# Patient Record
Sex: FEMALE | Race: WHITE | NOT HISPANIC OR LATINO | Employment: UNEMPLOYED | URBAN - NONMETROPOLITAN AREA
[De-identification: names, ages, dates, MRNs, and addresses within clinical notes are randomized per-mention and may not be internally consistent; named-entity substitution may affect disease eponyms.]

---

## 2019-11-19 ENCOUNTER — OFFICE VISIT (OUTPATIENT)
Dept: FAMILY MEDICINE | Facility: OTHER | Age: 64
End: 2019-11-19
Attending: NURSE PRACTITIONER
Payer: COMMERCIAL

## 2019-11-19 ENCOUNTER — HOSPITAL ENCOUNTER (OUTPATIENT)
Dept: GENERAL RADIOLOGY | Facility: OTHER | Age: 64
Discharge: HOME | End: 2019-11-19
Attending: NURSE PRACTITIONER | Admitting: NURSE PRACTITIONER
Payer: COMMERCIAL

## 2019-11-19 VITALS
TEMPERATURE: 98.7 F | DIASTOLIC BLOOD PRESSURE: 54 MMHG | WEIGHT: 100 LBS | HEIGHT: 63 IN | BODY MASS INDEX: 17.72 KG/M2 | SYSTOLIC BLOOD PRESSURE: 98 MMHG | RESPIRATION RATE: 20 BRPM

## 2019-11-19 DIAGNOSIS — F10.20 ALCOHOLISM (H): ICD-10-CM

## 2019-11-19 DIAGNOSIS — D53.9 MACROCYTIC ANEMIA: ICD-10-CM

## 2019-11-19 DIAGNOSIS — R05.9 COUGH: Primary | ICD-10-CM

## 2019-11-19 DIAGNOSIS — R01.1 HEART MURMUR: ICD-10-CM

## 2019-11-19 DIAGNOSIS — Z01.30 BLOOD PRESSURE CHECK: ICD-10-CM

## 2019-11-19 DIAGNOSIS — R05.9 COUGH: ICD-10-CM

## 2019-11-19 LAB
ALBUMIN SERPL-MCNC: 4.4 G/DL (ref 3.5–5.7)
ALP SERPL-CCNC: 87 U/L (ref 34–104)
ALT SERPL W P-5'-P-CCNC: 19 U/L (ref 7–52)
ANION GAP SERPL CALCULATED.3IONS-SCNC: 11 MMOL/L (ref 3–14)
AST SERPL W P-5'-P-CCNC: 28 U/L (ref 13–39)
BASOPHILS # BLD AUTO: 0 10E9/L (ref 0–0.2)
BASOPHILS NFR BLD AUTO: 0.8 %
BILIRUB SERPL-MCNC: 0.3 MG/DL (ref 0.3–1)
BUN SERPL-MCNC: 13 MG/DL (ref 7–25)
CALCIUM SERPL-MCNC: 10.1 MG/DL (ref 8.6–10.3)
CHLORIDE SERPL-SCNC: 99 MMOL/L (ref 98–107)
CO2 SERPL-SCNC: 24 MMOL/L (ref 21–31)
CREAT SERPL-MCNC: 0.75 MG/DL (ref 0.6–1.2)
DIFFERENTIAL METHOD BLD: ABNORMAL
EOSINOPHIL # BLD AUTO: 0.1 10E9/L (ref 0–0.7)
EOSINOPHIL NFR BLD AUTO: 2.1 %
ERYTHROCYTE [DISTWIDTH] IN BLOOD BY AUTOMATED COUNT: 15 % (ref 10–15)
FOLATE SERPL-MCNC: 20.4 NG/ML
GFR SERPL CREATININE-BSD FRML MDRD: 79 ML/MIN/{1.73_M2}
GLUCOSE SERPL-MCNC: 80 MG/DL (ref 70–105)
HCT VFR BLD AUTO: 32.7 % (ref 35–47)
HGB BLD-MCNC: 10.6 G/DL (ref 11.7–15.7)
IMM GRANULOCYTES # BLD: 0 10E9/L (ref 0–0.4)
IMM GRANULOCYTES NFR BLD: 0.2 %
LYMPHOCYTES # BLD AUTO: 0.8 10E9/L (ref 0.8–5.3)
LYMPHOCYTES NFR BLD AUTO: 16.8 %
MCH RBC QN AUTO: 33.3 PG (ref 26.5–33)
MCHC RBC AUTO-ENTMCNC: 32.4 G/DL (ref 31.5–36.5)
MCV RBC AUTO: 103 FL (ref 78–100)
MONOCYTES # BLD AUTO: 0.7 10E9/L (ref 0–1.3)
MONOCYTES NFR BLD AUTO: 14.4 %
NEUTROPHILS # BLD AUTO: 3.2 10E9/L (ref 1.6–8.3)
NEUTROPHILS NFR BLD AUTO: 65.7 %
PLATELET # BLD AUTO: 231 10E9/L (ref 150–450)
POTASSIUM SERPL-SCNC: 4.3 MMOL/L (ref 3.5–5.1)
PROT SERPL-MCNC: 8.9 G/DL (ref 6.4–8.9)
RBC # BLD AUTO: 3.18 10E12/L (ref 3.8–5.2)
SODIUM SERPL-SCNC: 134 MMOL/L (ref 134–144)
TSH SERPL DL<=0.05 MIU/L-ACNC: 1.21 IU/ML (ref 0.34–5.6)
VIT B12 SERPL-MCNC: 250 PG/ML (ref 180–914)
WBC # BLD AUTO: 4.9 10E9/L (ref 4–11)

## 2019-11-19 PROCEDURE — 82746 ASSAY OF FOLIC ACID SERUM: CPT | Mod: ZL | Performed by: NURSE PRACTITIONER

## 2019-11-19 PROCEDURE — 71046 X-RAY EXAM CHEST 2 VIEWS: CPT

## 2019-11-19 PROCEDURE — 84443 ASSAY THYROID STIM HORMONE: CPT | Mod: ZL | Performed by: NURSE PRACTITIONER

## 2019-11-19 PROCEDURE — 36415 COLL VENOUS BLD VENIPUNCTURE: CPT | Mod: ZL | Performed by: NURSE PRACTITIONER

## 2019-11-19 PROCEDURE — 82607 VITAMIN B-12: CPT | Mod: ZL | Performed by: NURSE PRACTITIONER

## 2019-11-19 PROCEDURE — 85025 COMPLETE CBC W/AUTO DIFF WBC: CPT | Mod: ZL | Performed by: NURSE PRACTITIONER

## 2019-11-19 PROCEDURE — 80053 COMPREHEN METABOLIC PANEL: CPT | Mod: ZL | Performed by: NURSE PRACTITIONER

## 2019-11-19 PROCEDURE — 99203 OFFICE O/P NEW LOW 30 MIN: CPT | Performed by: NURSE PRACTITIONER

## 2019-11-19 RX ORDER — PRENATAL VIT/IRON FUM/FOLIC AC 27MG-0.8MG
1 TABLET ORAL
COMMUNITY

## 2019-11-19 ASSESSMENT — ENCOUNTER SYMPTOMS
LIGHT-HEADEDNESS: 0
ABDOMINAL PAIN: 0
NAUSEA: 0
SINUS PRESSURE: 0
HEMATOCHEZIA: 0
WHEEZING: 0
GASTROINTESTINAL NEGATIVE: 1
SORE THROAT: 0
SHORTNESS OF BREATH: 0
SINUS PAIN: 0
HEMATURIA: 0
VOMITING: 0
DIZZINESS: 0
COUGH: 1
FATIGUE: 1
CHILLS: 0
DIARRHEA: 0
FEVER: 0

## 2019-11-19 ASSESSMENT — PAIN SCALES - GENERAL: PAINLEVEL: NO PAIN (0)

## 2019-11-19 ASSESSMENT — MIFFLIN-ST. JEOR: SCORE: 987.73

## 2019-11-19 NOTE — PROGRESS NOTES
"  SUBJECTIVE:   Mary Strauss is a 63 year old female who presents to clinic today for the following health issues:    HPI  Patient presents for blood pressure evaluation. She is currently at [facility] for alcohol abuse. She started treatment on November 30 for alcohol abuse after DUI. She reports binge drinking 3 drinks of hard liquor, but notes not daily alcohol use. She lives with her spouse in [place].   She reports long standing history of hypertension. She brings records from [facility] that documents average blood pressures in the 140's systolic. She has not been on blood pressure medications in the past.   She also notes nasal congestion that moved into her chest over the last 10 days. She notes cough for the last 10 days. She reports no fevers, but notes acheness. She is current smoker and has smoked for years. She notes she got her roommate ill now too.     There are no active problems to display for this patient.    History reviewed. No pertinent past medical history.   History reviewed. No pertinent surgical history.    Review of Systems   Constitutional: Positive for fatigue. Negative for chills and fever.   HENT: Positive for congestion. Negative for ear discharge, ear pain, sinus pressure, sinus pain and sore throat.    Respiratory: Positive for cough. Negative for shortness of breath and wheezing.    Cardiovascular: Negative for chest pain.   Gastrointestinal: Negative.  Negative for abdominal pain, diarrhea, hematochezia, nausea and vomiting.   Genitourinary: Negative for hematuria and vaginal bleeding.   Neurological: Negative for dizziness and light-headedness.        OBJECTIVE:     BP 98/54 (BP Location: Right arm, Patient Position: Sitting, Cuff Size: Adult Regular)   Temp 98.7  F (37.1  C) (Tympanic)   Resp 20   Ht 1.6 m (5' 3\")   Wt 45.4 kg (100 lb)   LMP  (LMP Unknown)   Breastfeeding No   BMI 17.71 kg/m    Body mass index is 17.71 kg/m .  Physical Exam  Constitutional:      "  Appearance: Normal appearance.   HENT:      Head: Normocephalic.      Right Ear: Tympanic membrane normal.      Left Ear: Tympanic membrane normal.      Nose: Rhinorrhea present.      Mouth/Throat:      Mouth: Mucous membranes are moist.   Eyes:      Conjunctiva/sclera: Conjunctivae normal.   Cardiovascular:      Rate and Rhythm: Normal rate and regular rhythm.      Heart sounds: Murmur present.   Pulmonary:      Effort: Pulmonary effort is normal.      Breath sounds: Decreased breath sounds present. No wheezing.   Abdominal:      Palpations: Abdomen is soft.      Tenderness: There is no abdominal tenderness.   Lymphadenopathy:      Cervical: No cervical adenopathy.   Skin:     General: Skin is warm and dry.      Coloration: Skin is not pale.   Neurological:      Mental Status: She is alert.         Diagnostic Test Results:  Results for orders placed or performed in visit on 11/19/19 (from the past 24 hour(s))   Comprehensive Metabolic Panel   Result Value Ref Range    Sodium 134 134 - 144 mmol/L    Potassium 4.3 3.5 - 5.1 mmol/L    Chloride 99 98 - 107 mmol/L    Carbon Dioxide 24 21 - 31 mmol/L    Anion Gap 11 3 - 14 mmol/L    Glucose 80 70 - 105 mg/dL    Urea Nitrogen 13 7 - 25 mg/dL    Creatinine 0.75 0.60 - 1.20 mg/dL    GFR Estimate 79 >60 mL/min/[1.73_m2]    GFR Estimate If Black >90 >60 mL/min/[1.73_m2]    Calcium 10.1 8.6 - 10.3 mg/dL    Bilirubin Total 0.3 0.3 - 1.0 mg/dL    Albumin 4.4 3.5 - 5.7 g/dL    Protein Total 8.9 6.4 - 8.9 g/dL    Alkaline Phosphatase 87 34 - 104 U/L    ALT 19 7 - 52 U/L    AST 28 13 - 39 U/L   CBC and Differential   Result Value Ref Range    WBC 4.9 4.0 - 11.0 10e9/L    RBC Count 3.18 (L) 3.8 - 5.2 10e12/L    Hemoglobin 10.6 (L) 11.7 - 15.7 g/dL    Hematocrit 32.7 (L) 35.0 - 47.0 %     (H) 78 - 100 fl    MCH 33.3 (H) 26.5 - 33.0 pg    MCHC 32.4 31.5 - 36.5 g/dL    RDW 15.0 10.0 - 15.0 %    Platelet Count 231 150 - 450 10e9/L    Diff Method Automated Method     %  Neutrophils 65.7 %    % Lymphocytes 16.8 %    % Monocytes 14.4 %    % Eosinophils 2.1 %    % Basophils 0.8 %    % Immature Granulocytes 0.2 %    Absolute Neutrophil 3.2 1.6 - 8.3 10e9/L    Absolute Lymphocytes 0.8 0.8 - 5.3 10e9/L    Absolute Monocytes 0.7 0.0 - 1.3 10e9/L    Absolute Eosinophils 0.1 0.0 - 0.7 10e9/L    Absolute Basophils 0.0 0.0 - 0.2 10e9/L    Abs Immature Granulocytes 0.0 0 - 0.4 10e9/L   PROCEDURE: XR CHEST 2 VW 11/19/2019 3:00 PM     HISTORY: smoker. Cough x 10 days.; Cough     COMPARISONS: None.     TECHNIQUE: 2 views.     FINDINGS: Heart and pulmonary vasculature are normal. Lungs are  hyperinflated with flattening of the diaphragm. No acute infiltrate or  effusion is seen. There is some chronic appearing interstitial change.     There is sclerotic change in the proximal left humerus, possibly  related to avascular necrosis.                                                                        IMPRESSION: No acute disease.       ASSESSMENT/PLAN:   1. Cough  XR clear. Did show hyperinflated lungs and flattened diaphragm consistent with COPD. No infiltrate noted. WBC normal. CMP WNL. Likely illness represents acute bronchitis. I suggested albuterol inhaler, but patient declines. She would like to continue with at home remedies including cough medicine and Vicks Vapor rub. I suggested rest and increasing fluids. If not improving in the next few days or worsening she should be re-evaluated. I strongly encouraged smoking cessation, which patient declines interventions at this time.   - XR Chest 2 Views; Future  - CBC and Differential; Future  - Comprehensive Metabolic Panel; Future  - Comprehensive Metabolic Panel  - CBC and Differential    2. Heart murmur  Murmur noted upon exam. Patient was never told that she had murmur before. However, she is a poor historian. Echocardiogram to be completed for further evaluation.   - Echocardiogram Complete; Future    3. Alcoholism (H)  - Vitamin B12;  Future  - Folic Acid; Future    4. Macrocytic anemia  CBC showed macrocytic anemia. Further evaluation of Vitamin B12, Folic Acid and TSH would be warranted. However these could not be added on to labs today, so patient will return this week to have these completed.   - Vitamin B12; Future  - Folic Acid; Future  - TSH; Future    5. Blood pressure check  Patient had appointment for elevated blood pressure. However at this visit patient's blood pressure is low. I did not feel based on this findings today that it would be safe to start patient on medication. With recent illness she notes poor appetite and likely is dehydrated. Encouraged hydration. Keep evaluating BP and have patient re-evaluated for BP after illness subsides. Hemoglobin is low, but improved from prior studies. Denies bleeding from bowel, bladder or vaginal.     Megan Dominguez [facility] AND [facility]

## 2019-11-19 NOTE — NURSING NOTE
Patient presents to clinic experiencing chest congestion, cough, sinus drainage, sore throat and body aches for the past 10 days.     Medication Reconciliation: complete    Aileen Yun LPN

## 2019-11-20 ENCOUNTER — TELEPHONE (OUTPATIENT)
Dept: FAMILY MEDICINE | Facility: OTHER | Age: 64
End: 2019-11-20

## 2019-11-20 NOTE — TELEPHONE ENCOUNTER
Spoke with Nurse Arrieta at [facility] and let her know these labs were drawn yesterday and the lab only appt can be cancelled.   Gabriela Christensen LPN on 11/20/2019 at 1:55 PM

## 2019-11-25 ENCOUNTER — HOSPITAL ENCOUNTER (EMERGENCY)
Facility: OTHER | Age: 64
Discharge: OTHER INSTITUTION | End: 2019-11-25
Attending: PHYSICIAN ASSISTANT | Admitting: PHYSICIAN ASSISTANT
Payer: COMMERCIAL

## 2019-11-25 ENCOUNTER — OFFICE VISIT (OUTPATIENT)
Dept: FAMILY MEDICINE | Facility: OTHER | Age: 64
End: 2019-11-25
Attending: NURSE PRACTITIONER
Payer: COMMERCIAL

## 2019-11-25 ENCOUNTER — APPOINTMENT (OUTPATIENT)
Dept: GENERAL RADIOLOGY | Facility: OTHER | Age: 64
End: 2019-11-25
Attending: PHYSICIAN ASSISTANT
Payer: COMMERCIAL

## 2019-11-25 ENCOUNTER — APPOINTMENT (OUTPATIENT)
Dept: CT IMAGING | Facility: OTHER | Age: 64
End: 2019-11-25
Attending: PHYSICIAN ASSISTANT
Payer: COMMERCIAL

## 2019-11-25 VITALS
OXYGEN SATURATION: 99 % | BODY MASS INDEX: 16.99 KG/M2 | RESPIRATION RATE: 20 BRPM | SYSTOLIC BLOOD PRESSURE: 104 MMHG | TEMPERATURE: 98.6 F | WEIGHT: 95.9 LBS | HEIGHT: 63 IN | HEART RATE: 101 BPM | DIASTOLIC BLOOD PRESSURE: 60 MMHG

## 2019-11-25 VITALS
SYSTOLIC BLOOD PRESSURE: 143 MMHG | WEIGHT: 95 LBS | HEIGHT: 63 IN | DIASTOLIC BLOOD PRESSURE: 72 MMHG | OXYGEN SATURATION: 92 % | RESPIRATION RATE: 18 BRPM | BODY MASS INDEX: 16.83 KG/M2 | TEMPERATURE: 98.3 F | HEART RATE: 95 BPM

## 2019-11-25 DIAGNOSIS — R05.3 CHRONIC COUGH: ICD-10-CM

## 2019-11-25 DIAGNOSIS — R63.0 DECREASED APPETITE: ICD-10-CM

## 2019-11-25 DIAGNOSIS — R06.00 DYSPNEA, UNSPECIFIED TYPE: Primary | ICD-10-CM

## 2019-11-25 DIAGNOSIS — E83.42 HYPOMAGNESEMIA: ICD-10-CM

## 2019-11-25 DIAGNOSIS — R07.81 PLEURITIC CHEST PAIN: ICD-10-CM

## 2019-11-25 DIAGNOSIS — J40 BRONCHITIS: ICD-10-CM

## 2019-11-25 DIAGNOSIS — J18.0 BRONCHOPNEUMONIA: ICD-10-CM

## 2019-11-25 LAB
ALBUMIN SERPL-MCNC: 3.9 G/DL (ref 3.5–5.7)
ALP SERPL-CCNC: 66 U/L (ref 34–104)
ALT SERPL W P-5'-P-CCNC: 11 U/L (ref 7–52)
AMYLASE SERPL-CCNC: 42 U/L (ref 29–103)
ANION GAP SERPL CALCULATED.3IONS-SCNC: 10 MMOL/L (ref 3–14)
AST SERPL W P-5'-P-CCNC: 16 U/L (ref 13–39)
BASOPHILS # BLD AUTO: 0 10E9/L (ref 0–0.2)
BASOPHILS NFR BLD AUTO: 0.3 %
BILIRUB SERPL-MCNC: 0.3 MG/DL (ref 0.3–1)
BUN SERPL-MCNC: 14 MG/DL (ref 7–25)
CALCIUM SERPL-MCNC: 9.6 MG/DL (ref 8.6–10.3)
CHLORIDE SERPL-SCNC: 101 MMOL/L (ref 98–107)
CO2 SERPL-SCNC: 25 MMOL/L (ref 21–31)
CREAT SERPL-MCNC: 0.63 MG/DL (ref 0.6–1.2)
D DIMER PPP DDU-MCNC: 584 NG/ML D-DU (ref 0–230)
DIFFERENTIAL METHOD BLD: ABNORMAL
EOSINOPHIL # BLD AUTO: 0 10E9/L (ref 0–0.7)
EOSINOPHIL NFR BLD AUTO: 0.5 %
ERYTHROCYTE [DISTWIDTH] IN BLOOD BY AUTOMATED COUNT: 14.6 % (ref 10–15)
GFR SERPL CREATININE-BSD FRML MDRD: >90 ML/MIN/{1.73_M2}
GLUCOSE SERPL-MCNC: 76 MG/DL (ref 70–105)
HCT VFR BLD AUTO: 29.9 % (ref 35–47)
HGB BLD-MCNC: 9.8 G/DL (ref 11.7–15.7)
IMM GRANULOCYTES # BLD: 0 10E9/L (ref 0–0.4)
IMM GRANULOCYTES NFR BLD: 0.3 %
INR PPP: 1.1 (ref 0–1.3)
LIPASE SERPL-CCNC: 34 U/L (ref 11–82)
LYMPHOCYTES # BLD AUTO: 1.4 10E9/L (ref 0.8–5.3)
LYMPHOCYTES NFR BLD AUTO: 18 %
MAGNESIUM SERPL-MCNC: 1.7 MG/DL (ref 1.9–2.7)
MCH RBC QN AUTO: 32.6 PG (ref 26.5–33)
MCHC RBC AUTO-ENTMCNC: 32.8 G/DL (ref 31.5–36.5)
MCV RBC AUTO: 99 FL (ref 78–100)
MONOCYTES # BLD AUTO: 0.9 10E9/L (ref 0–1.3)
MONOCYTES NFR BLD AUTO: 11.9 %
NEUTROPHILS # BLD AUTO: 5.3 10E9/L (ref 1.6–8.3)
NEUTROPHILS NFR BLD AUTO: 69 %
NT-PROBNP SERPL-MCNC: 88 PG/ML (ref 0–100)
PLATELET # BLD AUTO: 265 10E9/L (ref 150–450)
POTASSIUM SERPL-SCNC: 4.2 MMOL/L (ref 3.5–5.1)
PROT SERPL-MCNC: 7.7 G/DL (ref 6.4–8.9)
RBC # BLD AUTO: 3.01 10E12/L (ref 3.8–5.2)
SODIUM SERPL-SCNC: 136 MMOL/L (ref 134–144)
TROPONIN I SERPL-MCNC: 7.3 PG/ML
WBC # BLD AUTO: 7.7 10E9/L (ref 4–11)

## 2019-11-25 PROCEDURE — 94640 AIRWAY INHALATION TREATMENT: CPT

## 2019-11-25 PROCEDURE — 84484 ASSAY OF TROPONIN QUANT: CPT | Performed by: PHYSICIAN ASSISTANT

## 2019-11-25 PROCEDURE — 25000125 ZZHC RX 250: Performed by: PHYSICIAN ASSISTANT

## 2019-11-25 PROCEDURE — 36415 COLL VENOUS BLD VENIPUNCTURE: CPT | Performed by: PHYSICIAN ASSISTANT

## 2019-11-25 PROCEDURE — 83690 ASSAY OF LIPASE: CPT | Performed by: PHYSICIAN ASSISTANT

## 2019-11-25 PROCEDURE — 96366 THER/PROPH/DIAG IV INF ADDON: CPT | Performed by: PHYSICIAN ASSISTANT

## 2019-11-25 PROCEDURE — 99285 EMERGENCY DEPT VISIT HI MDM: CPT | Mod: 25 | Performed by: PHYSICIAN ASSISTANT

## 2019-11-25 PROCEDURE — 25800030 ZZH RX IP 258 OP 636: Performed by: PHYSICIAN ASSISTANT

## 2019-11-25 PROCEDURE — 85025 COMPLETE CBC W/AUTO DIFF WBC: CPT | Performed by: PHYSICIAN ASSISTANT

## 2019-11-25 PROCEDURE — 93005 ELECTROCARDIOGRAM TRACING: CPT | Performed by: PHYSICIAN ASSISTANT

## 2019-11-25 PROCEDURE — 83735 ASSAY OF MAGNESIUM: CPT | Performed by: PHYSICIAN ASSISTANT

## 2019-11-25 PROCEDURE — 40000275 ZZH STATISTIC RCP TIME EA 10 MIN

## 2019-11-25 PROCEDURE — 83880 ASSAY OF NATRIURETIC PEPTIDE: CPT | Performed by: PHYSICIAN ASSISTANT

## 2019-11-25 PROCEDURE — 99207 ZZC NO CHARGE LOS: CPT | Performed by: NURSE PRACTITIONER

## 2019-11-25 PROCEDURE — 25000132 ZZH RX MED GY IP 250 OP 250 PS 637: Performed by: PHYSICIAN ASSISTANT

## 2019-11-25 PROCEDURE — 25000128 H RX IP 250 OP 636: Performed by: PHYSICIAN ASSISTANT

## 2019-11-25 PROCEDURE — 71275 CT ANGIOGRAPHY CHEST: CPT

## 2019-11-25 PROCEDURE — 25500064 ZZH RX 255 OP 636: Performed by: PHYSICIAN ASSISTANT

## 2019-11-25 PROCEDURE — 96367 TX/PROPH/DG ADDL SEQ IV INF: CPT | Performed by: PHYSICIAN ASSISTANT

## 2019-11-25 PROCEDURE — 85610 PROTHROMBIN TIME: CPT | Performed by: PHYSICIAN ASSISTANT

## 2019-11-25 PROCEDURE — 82150 ASSAY OF AMYLASE: CPT | Performed by: PHYSICIAN ASSISTANT

## 2019-11-25 PROCEDURE — 80053 COMPREHEN METABOLIC PANEL: CPT | Performed by: PHYSICIAN ASSISTANT

## 2019-11-25 PROCEDURE — 99284 EMERGENCY DEPT VISIT MOD MDM: CPT | Mod: Z6 | Performed by: PHYSICIAN ASSISTANT

## 2019-11-25 PROCEDURE — 85379 FIBRIN DEGRADATION QUANT: CPT | Performed by: PHYSICIAN ASSISTANT

## 2019-11-25 PROCEDURE — 96375 TX/PRO/DX INJ NEW DRUG ADDON: CPT | Performed by: PHYSICIAN ASSISTANT

## 2019-11-25 PROCEDURE — 93010 ELECTROCARDIOGRAM REPORT: CPT | Performed by: INTERNAL MEDICINE

## 2019-11-25 PROCEDURE — 96365 THER/PROPH/DIAG IV INF INIT: CPT | Performed by: PHYSICIAN ASSISTANT

## 2019-11-25 PROCEDURE — C9113 INJ PANTOPRAZOLE SODIUM, VIA: HCPCS | Performed by: PHYSICIAN ASSISTANT

## 2019-11-25 PROCEDURE — 71045 X-RAY EXAM CHEST 1 VIEW: CPT

## 2019-11-25 RX ORDER — PREDNISONE 10 MG/1
TABLET ORAL
Qty: 30 TABLET | Refills: 0 | Status: SHIPPED | OUTPATIENT
Start: 2019-11-25

## 2019-11-25 RX ORDER — IPRATROPIUM BROMIDE AND ALBUTEROL SULFATE 2.5; .5 MG/3ML; MG/3ML
1 SOLUTION RESPIRATORY (INHALATION) 4 TIMES DAILY PRN
Qty: 90 VIAL | Refills: 3 | Status: SHIPPED | OUTPATIENT
Start: 2019-11-25

## 2019-11-25 RX ORDER — ASPIRIN 81 MG/1
324 TABLET, CHEWABLE ORAL ONCE
Status: COMPLETED | OUTPATIENT
Start: 2019-11-25 | End: 2019-11-25

## 2019-11-25 RX ORDER — IPRATROPIUM BROMIDE AND ALBUTEROL SULFATE 2.5; .5 MG/3ML; MG/3ML
3 SOLUTION RESPIRATORY (INHALATION) ONCE
Status: COMPLETED | OUTPATIENT
Start: 2019-11-25 | End: 2019-11-25

## 2019-11-25 RX ORDER — DEXTROMETHORPHAN POLISTIREX 30 MG/5ML
60 SUSPENSION ORAL 2 TIMES DAILY
Qty: 148 ML | Refills: 0 | Status: SHIPPED | OUTPATIENT
Start: 2019-11-25

## 2019-11-25 RX ORDER — LEVOFLOXACIN 750 MG/1
750 TABLET, FILM COATED ORAL DAILY
Qty: 10 TABLET | Refills: 0 | Status: SHIPPED | OUTPATIENT
Start: 2019-11-25 | End: 2019-12-05

## 2019-11-25 RX ORDER — BUDESONIDE 0.5 MG/2ML
0.5 INHALANT ORAL ONCE
Status: COMPLETED | OUTPATIENT
Start: 2019-11-25 | End: 2019-11-25

## 2019-11-25 RX ORDER — MAGNESIUM SULFATE HEPTAHYDRATE 40 MG/ML
2 INJECTION, SOLUTION INTRAVENOUS DAILY PRN
Status: DISCONTINUED | OUTPATIENT
Start: 2019-11-25 | End: 2019-11-25 | Stop reason: HOSPADM

## 2019-11-25 RX ORDER — MAGNESIUM OXIDE 400 MG/1
800 TABLET ORAL DAILY
Status: DISCONTINUED | OUTPATIENT
Start: 2019-11-25 | End: 2019-11-25 | Stop reason: ALTCHOICE

## 2019-11-25 RX ORDER — METHYLPREDNISOLONE SODIUM SUCCINATE 125 MG/2ML
125 INJECTION, POWDER, LYOPHILIZED, FOR SOLUTION INTRAMUSCULAR; INTRAVENOUS ONCE
Status: COMPLETED | OUTPATIENT
Start: 2019-11-25 | End: 2019-11-25

## 2019-11-25 RX ORDER — LEVOFLOXACIN 5 MG/ML
750 INJECTION, SOLUTION INTRAVENOUS ONCE
Status: COMPLETED | OUTPATIENT
Start: 2019-11-25 | End: 2019-11-25

## 2019-11-25 RX ORDER — SODIUM CHLORIDE 9 MG/ML
1000 INJECTION, SOLUTION INTRAVENOUS CONTINUOUS
Status: DISCONTINUED | OUTPATIENT
Start: 2019-11-25 | End: 2019-11-25 | Stop reason: HOSPADM

## 2019-11-25 RX ADMIN — FAMOTIDINE 20 MG: 20 INJECTION, SOLUTION INTRAVENOUS at 13:34

## 2019-11-25 RX ADMIN — METHYLPREDNISOLONE SODIUM SUCCINATE 125 MG: 125 INJECTION, POWDER, FOR SOLUTION INTRAMUSCULAR; INTRAVENOUS at 15:32

## 2019-11-25 RX ADMIN — LEVOFLOXACIN 750 MG: 5 INJECTION, SOLUTION INTRAVENOUS at 15:47

## 2019-11-25 RX ADMIN — IPRATROPIUM BROMIDE AND ALBUTEROL SULFATE 3 ML: .5; 3 SOLUTION RESPIRATORY (INHALATION) at 15:37

## 2019-11-25 RX ADMIN — BUDESONIDE 0.5 MG: 0.5 INHALANT RESPIRATORY (INHALATION) at 15:37

## 2019-11-25 RX ADMIN — SODIUM CHLORIDE 1000 ML: 9 INJECTION, SOLUTION INTRAVENOUS at 13:33

## 2019-11-25 RX ADMIN — ASPIRIN 81 MG 324 MG: 81 TABLET ORAL at 13:33

## 2019-11-25 RX ADMIN — MAGNESIUM SULFATE IN WATER 2 G: 40 INJECTION, SOLUTION INTRAVENOUS at 14:40

## 2019-11-25 RX ADMIN — PANTOPRAZOLE SODIUM 40 MG: 40 INJECTION, POWDER, LYOPHILIZED, FOR SOLUTION INTRAVENOUS at 13:34

## 2019-11-25 RX ADMIN — IOHEXOL 58 ML: 350 INJECTION, SOLUTION INTRAVENOUS at 14:27

## 2019-11-25 ASSESSMENT — ENCOUNTER SYMPTOMS
UNEXPECTED WEIGHT CHANGE: 1
COUGH: 1
TROUBLE SWALLOWING: 0
CHILLS: 0
CHEST TIGHTNESS: 0
EYES NEGATIVE: 1
VOMITING: 0
CONFUSION: 0
SHORTNESS OF BREATH: 1
TREMORS: 0
COUGH: 1
HEADACHES: 0
ADENOPATHY: 0
WEAKNESS: 1
HEMATOLOGIC/LYMPHATIC NEGATIVE: 1
HEMATURIA: 0
APPETITE CHANGE: 1
LIGHT-HEADEDNESS: 0
SORE THROAT: 0
BRUISES/BLEEDS EASILY: 0
FACIAL SWELLING: 0
SEIZURES: 0
DIARRHEA: 0
FEVER: 1
ABDOMINAL PAIN: 0
NAUSEA: 0
BACK PAIN: 0
FATIGUE: 1
WHEEZING: 1
CONSTIPATION: 0
WOUND: 0
FATIGUE: 1
FEVER: 0
MUSCULOSKELETAL NEGATIVE: 1
SHORTNESS OF BREATH: 1
WEAKNESS: 1
APPETITE CHANGE: 1
DIZZINESS: 0
ABDOMINAL PAIN: 0
HEADACHES: 1

## 2019-11-25 ASSESSMENT — MIFFLIN-ST. JEOR
SCORE: 965.05
SCORE: 969.13

## 2019-11-25 ASSESSMENT — PAIN SCALES - GENERAL: PAINLEVEL: MODERATE PAIN (5)

## 2019-11-25 NOTE — ED AVS SNAPSHOT
[facility]  [address]  [place] [address]  Phone:  [phone]  Fax:  [phone]                                    Mary Strauss   MRN: [id number]    Department:  [facility]   Date of Visit:  11/25/2019           After Visit Summary Signature Page    I have received my discharge instructions, and my questions have been answered. I have discussed any challenges I see with this plan with the nurse or doctor.    ..........................................................................................................................................  Patient/Patient Representative Signature      ..........................................................................................................................................  Patient Representative Print Name and Relationship to Patient    ..................................................               ................................................  Date                                   Time    ..........................................................................................................................................  Reviewed by Signature/Title    ...................................................              ..............................................  Date                                               Time          22EPIC Rev 08/18

## 2019-11-25 NOTE — ED TRIAGE NOTES
Pt arrives to the ED via W/C from [facility].  Pt states she has had a cough X3 weeks that has not gotten better.  Pt was seen in the clinic on 11/19 and since then the pt reports worsening cough, SOB and a 5lb. Weight loss.  Pt is fatigued, not eating or drinking well.  Pt denies N/V.  Pt is currently in treatment at [facility].

## 2019-11-25 NOTE — NURSING NOTE
"Chief Complaint   Patient presents with     Cough     Patient is here for a cough, fatigue, sore throat and bilateral ear pain that started 2 weeks ago.   Patient was seen on 11/19/19 and has had no relief. Patient states her pain is worse in the morning and at night.     Initial /60   Pulse 101   Temp 98.6  F (37  C) (Tympanic)   Resp 20   Ht 1.6 m (5' 3\")   Wt 43.5 kg (95 lb 14.4 oz)   LMP  (LMP Unknown)   SpO2 99%   BMI 16.99 kg/m   Estimated body mass index is 16.99 kg/m  as calculated from the following:    Height as of this encounter: 1.6 m (5' 3\").    Weight as of this encounter: 43.5 kg (95 lb 14.4 oz).  Medication Reconciliation: complete    Lauren Tobar, [place]  "

## 2019-11-25 NOTE — ED PROVIDER NOTES
History     Chief Complaint   Patient presents with     Shortness of Breath     Cough     This is a 63-year-old female who is currently undergoing treatment for alcoholism at [facility].  She was seen in the clinic on 11/19/2019 due to cough that she reports is been chronic over the past 2 to 3 weeks.  Denies any fever or chills.  She does report a 5 pound weight loss.  She is here for some worsening cough and shortness of breath.  She feels fatigued and weak.  She feels her appetite is decreased and she is not drinking well.  She reports that the last time she had any alcohol was on October.  Denies any other issues.  She does admit to having a smoking history and she continues to smoke daily.            Allergies:  Allergies   Allergen Reactions     Bee Venom Swelling     Throat swelled up, unable to talk for 2 days.       Problem List:    There are no active problems to display for this patient.       Past Medical History:    History reviewed. No pertinent past medical history.    Past Surgical History:    History reviewed. No pertinent surgical history.    Family History:    History reviewed. No pertinent family history.    Social History:  Marital Status:  Single [1]  Social History     Tobacco Use     Smoking status: Current Every Day Smoker     Smokeless tobacco: Never Used   Substance Use Topics     Alcohol use: Not Currently     Drug use: Not Currently        Medications:    Prenatal Vit-Fe Fumarate-FA (PRENATAL MULTIVITAMIN W/IRON) 27-0.8 MG tablet          Review of Systems   Constitutional: Positive for appetite change and fatigue. Negative for chills and fever.   HENT: Negative for congestion, facial swelling, sore throat and trouble swallowing.    Eyes: Negative for visual disturbance.   Respiratory: Positive for cough and shortness of breath. Negative for chest tightness.    Cardiovascular: Negative for chest pain.   Gastrointestinal: Negative for abdominal pain, constipation, diarrhea,  "nausea and vomiting.   Genitourinary: Negative for hematuria.   Musculoskeletal: Negative for back pain.   Skin: Negative for rash and wound.   Neurological: Positive for weakness. Negative for dizziness, tremors, seizures, syncope, light-headedness and headaches.   Hematological: Negative for adenopathy. Does not bruise/bleed easily.   Psychiatric/Behavioral: Negative for confusion.       Physical Exam   BP: 104/60  Heart Rate: 101  Temp: 98.6  F (37  C)  Resp: 18  Height: 160 cm (5' 3\")  Weight: 43.1 kg (95 lb)  SpO2: 99 %      Physical Exam  Constitutional:       General: She is not in acute distress.     Appearance: She is well-developed. She is not ill-appearing, toxic-appearing or diaphoretic.   HENT:      Head: Normocephalic and atraumatic.      Mouth/Throat:      Pharynx: No oropharyngeal exudate.   Eyes:      General: No scleral icterus.     Conjunctiva/sclera: Conjunctivae normal.      Pupils: Pupils are equal, round, and reactive to light.   Neck:      Musculoskeletal: Neck supple.   Cardiovascular:      Rate and Rhythm: Normal rate and regular rhythm.      Heart sounds: Normal heart sounds.   Pulmonary:      Effort: Pulmonary effort is normal. No respiratory distress.      Breath sounds: Normal breath sounds.      Comments: Lung sounds are clear slightly decreased to right lower lobe.  SaO2 is 98% on room air.  She appears in no respiratory distress.  Abdominal:      General: Bowel sounds are normal.      Palpations: Abdomen is soft.      Tenderness: There is no abdominal tenderness.   Musculoskeletal: Normal range of motion.         General: No tenderness or deformity.   Lymphadenopathy:      Cervical: No cervical adenopathy.   Skin:     General: Skin is warm and dry.      Capillary Refill: Capillary refill takes less than 2 seconds.      Findings: No rash.   Neurological:      General: No focal deficit present.      Mental Status: She is alert.   Psychiatric:         Mood and Affect: Mood normal.       "   Behavior: Behavior normal.         ED Course     EKG shows normal sinus rhythm with a heart rate of 89.    Results for orders placed or performed during the hospital encounter of 11/25/19 (from the past 24 hour(s))   CBC with platelets differential   Result Value Ref Range    WBC 7.7 4.0 - 11.0 10e9/L    RBC Count 3.01 (L) 3.8 - 5.2 10e12/L    Hemoglobin 9.8 (L) 11.7 - 15.7 g/dL    Hematocrit 29.9 (L) 35.0 - 47.0 %    MCV 99 78 - 100 fl    MCH 32.6 26.5 - 33.0 pg    MCHC 32.8 31.5 - 36.5 g/dL    RDW 14.6 10.0 - 15.0 %    Platelet Count 265 150 - 450 10e9/L    Diff Method Automated Method     % Neutrophils 69.0 %    % Lymphocytes 18.0 %    % Monocytes 11.9 %    % Eosinophils 0.5 %    % Basophils 0.3 %    % Immature Granulocytes 0.3 %    Absolute Neutrophil 5.3 1.6 - 8.3 10e9/L    Absolute Lymphocytes 1.4 0.8 - 5.3 10e9/L    Absolute Monocytes 0.9 0.0 - 1.3 10e9/L    Absolute Eosinophils 0.0 0.0 - 0.7 10e9/L    Absolute Basophils 0.0 0.0 - 0.2 10e9/L    Abs Immature Granulocytes 0.0 0 - 0.4 10e9/L   Comprehensive metabolic panel   Result Value Ref Range    Sodium 136 134 - 144 mmol/L    Potassium 4.2 3.5 - 5.1 mmol/L    Chloride 101 98 - 107 mmol/L    Carbon Dioxide 25 21 - 31 mmol/L    Anion Gap 10 3 - 14 mmol/L    Glucose 76 70 - 105 mg/dL    Urea Nitrogen 14 7 - 25 mg/dL    Creatinine 0.63 0.60 - 1.20 mg/dL    GFR Estimate >90 >60 mL/min/[1.73_m2]    GFR Estimate If Black >90 >60 mL/min/[1.73_m2]    Calcium 9.6 8.6 - 10.3 mg/dL    Bilirubin Total 0.3 0.3 - 1.0 mg/dL    Albumin 3.9 3.5 - 5.7 g/dL    Protein Total 7.7 6.4 - 8.9 g/dL    Alkaline Phosphatase 66 34 - 104 U/L    ALT 11 7 - 52 U/L    AST 16 13 - 39 U/L   Troponin GH (now)   Result Value Ref Range    Troponin 7.3 <34.0 pg/mL   INR   Result Value Ref Range    INR 1.10 0 - 1.3   Magnesium   Result Value Ref Range    Magnesium 1.7 (L) 1.9 - 2.7 mg/dL   D-Dimer GH   Result Value Ref Range    D-Dimer ng/mL 584 (H) 0 - 230 ng/ml D-DU   Lipase   Result Value  Ref Range    Lipase 34 11 - 82 U/L   Amylase   Result Value Ref Range    Amylase 42 29 - 103 U/L   Nt probnp inpatient (BNP)   Result Value Ref Range    N-Terminal Pro BNP Inpatient 88 0 - 100 pg/mL   XR Chest Port 1 View    Narrative    PROCEDURE:  XR CHEST PORT 1 VW    HISTORY: pain, SOB. .    COMPARISON:  11/19/2019    FINDINGS:    The cardiomediastinal contours are stable. There is calcific aortic  atherosclerosis.   No focal consolidation, effusion or pneumothorax. Lung volumes remain  elevated.  Distortion of the proximal left humerus is unchanged.      Impression    IMPRESSION:  Findings suggest air trapping/COPD      SANTHOSH SOTO MD   CT Chest Pulmonary Embolism w Contrast    Narrative    PROCEDURE:  CT CHEST PULMONARY EMBOLISM W CONTRAST.    HISTORY:  Evaluate for pulmonary embolism.  Shortness of breath; PE  suspected, intermediate prob, positive D-dimer    TECHNIQUE:  Initial pre-contrast  and localizer images were  obtained.  Contrast enhanced helical CT pulmonary angiography was then  performed.  Routine transaxial and post-processed (multiplanar and/or  MIP) reformations were obtained.    COMPARISON:  11/25/2019, 11/19/2019    MEDS/CONTRAST: Omni 350 100mL    PULMONARY CTA FINDINGS:  This is a diagnostic quality helical CT  pulmonary angiogram.  There is no acute pulmonary embolism to the  first subsegmental pulmonary artery level.    OTHER FINDINGS:      The heart size is normal. There is no pericardial thickening or  effusions. Diffuse borderline enlarged mediastinal as well as right  hilar nodes are present. The thoracic aorta and main pulmonary artery  are within normal limits in caliber, the former with advanced  atherosclerotic plaque. A small hiatal hernia is present. Extensive  small calcifications in the pancreas suggest chronic pancreatitis. The  left adrenal gland is thickened.    The pleura is without thickening or effusions. Diffuse bronchial wall  thickening is present. There  is advanced centrilobular emphysema.  There is patchy branching airspace opacity in the anterior right lower  lobe.    Bilateral humeral head AVN is present. The bones are  osteopenic/osteoporotic.      Impression    IMPRESSION:    No acute pulmonary emboli to the subsegmental level.    COPD with superimposed acute bronchopneumonia, best seen in the right  lower lobe.    SANTHOSH SOTO MD       Medications   aspirin (ASA) chewable tablet 324 mg (324 mg Oral Given 11/25/19 1333)   0.9% sodium chloride BOLUS (0 mLs Intravenous Stopped 11/25/19 1725)   famotidine (PEPCID) infusion 20 mg (0 mg Intravenous Stopped 11/25/19 1400)   pantoprazole (PROTONIX) 40 mg IV push injection (40 mg Intravenous Given 11/25/19 1334)   iohexol (OMNIPAQUE) 350 mg/mL solution 58 mL (58 mLs Intravenous Given 11/25/19 1427)   ipratropium - albuterol 0.5 mg/2.5 mg/3 mL (DUONEB) neb solution 3 mL (3 mLs Nebulization Given 11/25/19 1537)   budesonide (PULMICORT) neb solution 0.5 mg (0.5 mg Nebulization Given 11/25/19 1537)   methylPREDNISolone sodium succinate (solu-MEDROL) injection 125 mg (125 mg Intravenous Given 11/25/19 1532)   levofloxacin (LEVAQUIN) infusion 750 mg (0 mg Intravenous Stopped 11/25/19 1725)       Assessments & Plan (with Medical Decision Making)     I have reviewed the nursing notes.    I have reviewed the findings, diagnosis, plan and need for follow up with the patient.      Discharge Medication List as of 11/25/2019  5:25 PM      START taking these medications    Details   dextromethorphan (DELSYM) 30 MG/5ML liquid Take 10 mLs (60 mg) by mouth 2 times daily, Disp-148 mL, R-0, Local Print      ipratropium - albuterol 0.5 mg/2.5 mg/3 mL (DUONEB) 0.5-2.5 (3) MG/3ML neb solution Take 1 vial (3 mLs) by nebulization 4 times daily as needed for shortness of breath / dyspnea or wheezing, Disp-90 vial, R-3, Local Print      levofloxacin (LEVAQUIN) 750 MG tablet Take 1 tablet (750 mg) by mouth daily for 10 days, Disp-10  tablet, R-0, Local Print      predniSONE (DELTASONE) 10 MG tablet Take 4 tablets daily for 3 days,  take 3 tablets daily for 3 days, take 2 tablet daily for 3 days, take 1 tablet daily  for 3 days., Disp-30 tablet, R-0, Local Print      Respiratory Therapy Supplies (NEBULIZER) ANTON Take 1 Device by mouth 4 times daily, Disp-1 each, R-0, Local Print             Final diagnoses:   Bronchopneumonia   Chronic cough   Pleuritic chest pain   Bronchitis   Hypomagnesemia     Afebrile.  Vital signs stable.  Chronic persistent cough x3 weeks.  Atypical chest pain/pleuritic chest pain.  IV established and she was given fluids, Pepcid and Protonix initially..  EKG shows normal sinus rhythm.  Heart rate is 89.   Troponin is normal.  INR is normal.  D-dimer slightly elevated at 584.  CBC shows normal white blood cells no left shift.  Her hemoglobin is 8.9.  CMP is normal.  Her magnesium is decreased at 1.7.  IV replacement initiated.  I instructed patient on ways to increase her magnesium in her diet as well.  Lipase, amylase, and BNP are normal.  Chest x-ray is normal.  She was given DuoNeb, Pulmicort, and Solu-Medrol.  Given her chronic cough, dyspnea and elevated d-dimer CT PE study was performed and that shows COPD with superimposed acute bronchopneumonia, best seen in the right lower lobe.  Given that we have IV access the patient was given Levaquin IV initially.  I discussed these findings with the patient.  She is feeling much better after the above treatment.  Rx for a nebulizer, DuoNeb's, prednisone taper, Levaquin, and dextromethorphan for her cough.  Follow-up with her primary care provider if symptoms persist for further evaluation as needed.  11/25/2019   [facility] AND \[facility]\""     Adelso Covington PA-C  11/25/19 2013       Adelso Covington PA-C  11/25/19 2013

## 2019-12-03 ENCOUNTER — HOSPITAL ENCOUNTER (OUTPATIENT)
Dept: CARDIOLOGY | Facility: OTHER | Age: 64
Discharge: HOME | End: 2019-12-03
Attending: NURSE PRACTITIONER | Admitting: NURSE PRACTITIONER
Payer: COMMERCIAL

## 2019-12-03 DIAGNOSIS — R01.1 HEART MURMUR: ICD-10-CM

## 2019-12-03 PROCEDURE — 93306 TTE W/DOPPLER COMPLETE: CPT | Mod: 26 | Performed by: INTERNAL MEDICINE

## 2019-12-03 PROCEDURE — 93306 TTE W/DOPPLER COMPLETE: CPT

## (undated) RX ORDER — ASPIRIN 81 MG/1
TABLET, CHEWABLE ORAL
Status: DISPENSED
Start: 2019-11-25

## (undated) RX ORDER — PANTOPRAZOLE SODIUM 40 MG/10ML
INJECTION, POWDER, LYOPHILIZED, FOR SOLUTION INTRAVENOUS
Status: DISPENSED
Start: 2019-11-25

## (undated) RX ORDER — BUDESONIDE 0.5 MG/2ML
INHALANT ORAL
Status: DISPENSED
Start: 2019-11-25

## (undated) RX ORDER — SODIUM CHLORIDE 9 MG/ML
INJECTION, SOLUTION INTRAVENOUS
Status: DISPENSED
Start: 2019-11-25

## (undated) RX ORDER — IPRATROPIUM BROMIDE AND ALBUTEROL SULFATE 2.5; .5 MG/3ML; MG/3ML
SOLUTION RESPIRATORY (INHALATION)
Status: DISPENSED
Start: 2019-11-25

## (undated) RX ORDER — MAGNESIUM SULFATE HEPTAHYDRATE 40 MG/ML
INJECTION, SOLUTION INTRAVENOUS
Status: DISPENSED
Start: 2019-11-25

## (undated) RX ORDER — LEVOFLOXACIN 5 MG/ML
INJECTION, SOLUTION INTRAVENOUS
Status: DISPENSED
Start: 2019-11-25

## (undated) RX ORDER — METHYLPREDNISOLONE SODIUM SUCCINATE 125 MG/2ML
INJECTION, POWDER, LYOPHILIZED, FOR SOLUTION INTRAMUSCULAR; INTRAVENOUS
Status: DISPENSED
Start: 2019-11-25